# Patient Record
Sex: FEMALE | HISPANIC OR LATINO
[De-identification: names, ages, dates, MRNs, and addresses within clinical notes are randomized per-mention and may not be internally consistent; named-entity substitution may affect disease eponyms.]

---

## 2017-01-09 ENCOUNTER — CHARTING TRANS (OUTPATIENT)
Dept: OTHER | Age: 42
End: 2017-01-09

## 2017-01-09 ENCOUNTER — LAB SERVICES (OUTPATIENT)
Dept: OTHER | Age: 42
End: 2017-01-09

## 2017-01-10 ENCOUNTER — CHARTING TRANS (OUTPATIENT)
Dept: OTHER | Age: 42
End: 2017-01-10

## 2017-01-10 LAB
ANION GAP SERPL CALC-SCNC: 13 MMOL/L (ref 10–20)
APTT PPP: 28 SEC (ref 22–30)
BUN SERPL-MCNC: 10 MG/DL (ref 10–20)
BUN/CREAT SERPL: 18 (ref 7–25)
CALCIUM SERPL-MCNC: 8.7 MG/DL (ref 8.4–10.2)
CHLORIDE SERPL-SCNC: 103 MMOL/L (ref 98–107)
CO2 SERPL-SCNC: 25 MMOL/L (ref 21–32)
CREAT SERPL-MCNC: 0.56 MG/DL (ref 0.51–0.95)
ERYTHROCYTE [DISTWIDTH] IN BLOOD: 12.9 % (ref 11–15)
GLUCOSE SERPL-MCNC: 78 MG/DL (ref 65–99)
HEMATOCRIT: 38.6 % (ref 36–46.5)
HEMOGLOBIN: 12.8 G/DL (ref 12–15.5)
INR PPP: 1
LENGTH OF FAST TIME PATIENT: NORMAL HRS
MEAN CORPUSCULAR HEMOGLOBIN: 30.2 PG (ref 26–34)
MEAN CORPUSCULAR HGB CONC: 33.2 G/DL (ref 32–36.5)
MEAN CORPUSCULAR VOLUME: 91 FL (ref 78–100)
PLATELET COUNT: 265 K/MCL (ref 140–450)
POTASSIUM SERPL-SCNC: 4.1 MMOL/L (ref 3.4–5.1)
PROTHROMBIN TIME: 11.2 SEC (ref 9.7–11.8)
RED CELL COUNT: 4.24 MIL/MCL (ref 4–5.2)
SODIUM SERPL-SCNC: 137 MMOL/L (ref 135–145)
WHITE BLOOD COUNT: 5.3 K/MCL (ref 4.2–11)

## 2017-02-01 ENCOUNTER — HOSPITAL (OUTPATIENT)
Dept: OTHER | Age: 42
End: 2017-02-01
Attending: OBSTETRICS & GYNECOLOGY

## 2017-02-02 LAB
ANALYZER ANC (IANC): ABNORMAL
BASOPHILS # BLD: 0 THOUSAND/MCL (ref 0–0.3)
BASOPHILS NFR BLD: 0 %
DIFFERENTIAL METHOD BLD: ABNORMAL
EOSINOPHIL # BLD: 0 THOUSAND/MCL (ref 0.1–0.5)
EOSINOPHIL NFR BLD: 0 %
ERYTHROCYTE [DISTWIDTH] IN BLOOD: 12.6 % (ref 11–15)
HEMATOCRIT: 37.4 % (ref 36–46.5)
HGB BLD-MCNC: 12.8 GM/DL (ref 12–15.5)
LYMPHOCYTES # BLD: 1.2 THOUSAND/MCL (ref 1–4.8)
LYMPHOCYTES NFR BLD: 10 %
MCH RBC QN AUTO: 30 PG (ref 26–34)
MCHC RBC AUTO-ENTMCNC: 34.2 GM/DL (ref 32–36.5)
MCV RBC AUTO: 87.8 FL (ref 78–100)
MONOCYTES # BLD: 0.7 THOUSAND/MCL (ref 0.3–0.9)
MONOCYTES NFR BLD: 6 %
NEUTROPHILS # BLD: 10 THOUSAND/MCL (ref 1.8–7.7)
NEUTROPHILS NFR BLD: 84 %
NEUTS SEG NFR BLD: ABNORMAL %
PERCENT NRBC: ABNORMAL
PLATELET # BLD: 227 THOUSAND/MCL (ref 140–450)
RBC # BLD: 4.26 MILLION/MCL (ref 4–5.2)
WBC # BLD: 11.9 THOUSAND/MCL (ref 4.2–11)

## 2017-02-07 ENCOUNTER — CHARTING TRANS (OUTPATIENT)
Dept: OTHER | Age: 42
End: 2017-02-07

## 2017-02-10 ENCOUNTER — LAB SERVICES (OUTPATIENT)
Dept: OTHER | Age: 42
End: 2017-02-10

## 2017-02-10 ENCOUNTER — CHARTING TRANS (OUTPATIENT)
Dept: OTHER | Age: 42
End: 2017-02-10

## 2017-02-10 ASSESSMENT — PAIN SCALES - GENERAL: PAINLEVEL_OUTOF10: 7

## 2017-02-11 LAB
APPEARANCE: NORMAL
BILIRUBIN: NORMAL
COLOR: YELLOW
GLUCOSE U: NORMAL
KETONES: NORMAL
LEUKOCYTE ESTERASE: NORMAL
NITRITE: NORMAL
OCCULT BLOOD: NORMAL
PH: 6.5
PROTEIN: NORMAL
URINE SPEC GRAVITY: 1.02
UROBILINOGEN: 0.2

## 2017-02-15 ENCOUNTER — LAB SERVICES (OUTPATIENT)
Dept: OTHER | Age: 42
End: 2017-02-15

## 2017-02-15 ENCOUNTER — CHARTING TRANS (OUTPATIENT)
Dept: OTHER | Age: 42
End: 2017-02-15

## 2017-02-15 ASSESSMENT — PAIN SCALES - GENERAL: PAINLEVEL_OUTOF10: 4

## 2017-02-16 LAB
APPEARANCE: NORMAL
BILIRUBIN: NORMAL
COLOR: YELLOW
GLUCOSE U: NORMAL
KETONES: NORMAL
LEUKOCYTE ESTERASE: NORMAL
NITRITE: NORMAL
OCCULT BLOOD: NORMAL
PH: 7
PROTEIN: NORMAL
URINE SPEC GRAVITY: 1.02
UROBILINOGEN: 0.2

## 2017-02-24 ENCOUNTER — CHARTING TRANS (OUTPATIENT)
Dept: OTHER | Age: 42
End: 2017-02-24

## 2017-03-22 ENCOUNTER — CHARTING TRANS (OUTPATIENT)
Dept: OTHER | Age: 42
End: 2017-03-22

## 2017-03-22 ENCOUNTER — MYAURORA ACCOUNT LINK (OUTPATIENT)
Dept: OTHER | Age: 42
End: 2017-03-22

## 2017-03-22 ENCOUNTER — IMAGING SERVICES (OUTPATIENT)
Dept: OTHER | Age: 42
End: 2017-03-22

## 2017-03-23 ENCOUNTER — CHARTING TRANS (OUTPATIENT)
Dept: OTHER | Age: 42
End: 2017-03-23

## 2017-03-29 ENCOUNTER — LAB SERVICES (OUTPATIENT)
Dept: OTHER | Age: 42
End: 2017-03-29

## 2017-03-29 ENCOUNTER — CHARTING TRANS (OUTPATIENT)
Dept: OTHER | Age: 42
End: 2017-03-29

## 2017-03-29 ENCOUNTER — MYAURORA ACCOUNT LINK (OUTPATIENT)
Dept: OTHER | Age: 42
End: 2017-03-29

## 2017-03-29 LAB
APPEARANCE: CLEAR
BILIRUBIN: NORMAL
COLOR: YELLOW
GLUCOSE U: NORMAL
KETONES: NORMAL
LEUKOCYTE ESTERASE: NORMAL
NITRITE: NORMAL
OCCULT BLOOD: NORMAL
PH: 7
PROTEIN: NORMAL
URINE SPEC GRAVITY: 1.02
UROBILINOGEN: 0.2

## 2017-03-29 ASSESSMENT — PAIN SCALES - GENERAL: PAINLEVEL_OUTOF10: 0

## 2017-04-11 ENCOUNTER — CHARTING TRANS (OUTPATIENT)
Dept: OTHER | Age: 42
End: 2017-04-11

## 2017-09-21 ENCOUNTER — CHARTING TRANS (OUTPATIENT)
Dept: OTHER | Age: 42
End: 2017-09-21

## 2018-03-09 ENCOUNTER — APPOINTMENT (OUTPATIENT)
Dept: CT IMAGING | Facility: HOSPITAL | Age: 43
End: 2018-03-09
Attending: EMERGENCY MEDICINE
Payer: COMMERCIAL

## 2018-03-09 ENCOUNTER — HOSPITAL ENCOUNTER (EMERGENCY)
Facility: HOSPITAL | Age: 43
Discharge: HOME | End: 2018-03-09
Payer: COMMERCIAL

## 2018-03-09 ENCOUNTER — HOSPITAL ENCOUNTER (OUTPATIENT)
Age: 43
Discharge: ANOTHER HOSPITAL | End: 2018-03-09
Attending: EMERGENCY MEDICINE
Payer: COMMERCIAL

## 2018-03-09 VITALS
DIASTOLIC BLOOD PRESSURE: 94 MMHG | OXYGEN SATURATION: 100 % | RESPIRATION RATE: 16 BRPM | HEART RATE: 87 BPM | SYSTOLIC BLOOD PRESSURE: 139 MMHG | TEMPERATURE: 98 F

## 2018-03-09 VITALS
HEIGHT: 63 IN | SYSTOLIC BLOOD PRESSURE: 130 MMHG | DIASTOLIC BLOOD PRESSURE: 91 MMHG | OXYGEN SATURATION: 99 % | HEART RATE: 85 BPM | RESPIRATION RATE: 18 BRPM | TEMPERATURE: 99 F | BODY MASS INDEX: 31.01 KG/M2 | WEIGHT: 175 LBS

## 2018-03-09 DIAGNOSIS — R07.89 CHEST PAIN, ATYPICAL: ICD-10-CM

## 2018-03-09 DIAGNOSIS — J02.0 STREP PHARYNGITIS: Primary | ICD-10-CM

## 2018-03-09 DIAGNOSIS — G44.52 NEW DAILY PERSISTENT HEADACHE: Primary | ICD-10-CM

## 2018-03-09 DIAGNOSIS — Z84.0 FAMILY HISTORY OF LUPUS ERYTHEMATOSUS: ICD-10-CM

## 2018-03-09 LAB
ANION GAP SERPL CALC-SCNC: 8 MMOL/L (ref 0–18)
BACTERIA UR QL AUTO: NEGATIVE /HPF
BASOPHILS # BLD: 0 K/UL (ref 0–0.2)
BASOPHILS NFR BLD: 0 %
BILIRUB UR QL: NEGATIVE
BUN SERPL-MCNC: 9 MG/DL (ref 8–20)
BUN/CREAT SERPL: 14.5 (ref 10–20)
CALCIUM SERPL-MCNC: 8.7 MG/DL (ref 8.5–10.5)
CHLORIDE SERPL-SCNC: 103 MMOL/L (ref 95–110)
CLARITY UR: CLEAR
CO2 SERPL-SCNC: 27 MMOL/L (ref 22–32)
COLOR UR: YELLOW
CREAT SERPL-MCNC: 0.62 MG/DL (ref 0.5–1.5)
CRP SERPL-MCNC: 0.5 MG/DL (ref 0–0.9)
EOSINOPHIL # BLD: 0.2 K/UL (ref 0–0.7)
EOSINOPHIL NFR BLD: 3 %
ERYTHROCYTE [DISTWIDTH] IN BLOOD BY AUTOMATED COUNT: 12.3 % (ref 11–15)
ERYTHROCYTE [SEDIMENTATION RATE] IN BLOOD: 9 MM/HR (ref 0–20)
GLUCOSE BLDC GLUCOMTR-MCNC: 76 MG/DL (ref 70–99)
GLUCOSE SERPL-MCNC: 83 MG/DL (ref 70–99)
GLUCOSE UR-MCNC: NEGATIVE MG/DL
HCT VFR BLD AUTO: 38.1 % (ref 35–48)
HGB BLD-MCNC: 13 G/DL (ref 12–16)
HGB UR QL STRIP.AUTO: NEGATIVE
KETONES UR-MCNC: NEGATIVE MG/DL
LEUKOCYTE ESTERASE UR QL STRIP.AUTO: NEGATIVE
LYMPHOCYTES # BLD: 2.1 K/UL (ref 1–4)
LYMPHOCYTES NFR BLD: 34 %
MCH RBC QN AUTO: 30.3 PG (ref 27–32)
MCHC RBC AUTO-ENTMCNC: 34 G/DL (ref 32–37)
MCV RBC AUTO: 89.2 FL (ref 80–100)
MONOCYTES # BLD: 0.4 K/UL (ref 0–1)
MONOCYTES NFR BLD: 7 %
NEUTROPHILS # BLD AUTO: 3.4 K/UL (ref 1.8–7.7)
NEUTROPHILS NFR BLD: 55 %
NITRITE UR QL STRIP.AUTO: NEGATIVE
OSMOLALITY UR CALC.SUM OF ELEC: 284 MOSM/KG (ref 275–295)
PH UR: 5 [PH] (ref 5–8)
PLATELET # BLD AUTO: 238 K/UL (ref 140–400)
PMV BLD AUTO: 8.7 FL (ref 7.4–10.3)
POTASSIUM SERPL-SCNC: 3.7 MMOL/L (ref 3.3–5.1)
PROT UR-MCNC: NEGATIVE MG/DL
RBC # BLD AUTO: 4.27 M/UL (ref 3.7–5.4)
RBC #/AREA URNS AUTO: 2 /HPF
S PYO AG THROAT QL: POSITIVE
SODIUM SERPL-SCNC: 138 MMOL/L (ref 136–144)
SP GR UR STRIP: 1.02 (ref 1–1.03)
TROPONIN I SERPL-MCNC: 0 NG/ML (ref ?–0.03)
UROBILINOGEN UR STRIP-ACNC: <2
VIT C UR-MCNC: 40 MG/DL
WBC # BLD AUTO: 6.2 K/UL (ref 4–11)
WBC #/AREA URNS AUTO: <1 /HPF

## 2018-03-09 PROCEDURE — 82962 GLUCOSE BLOOD TEST: CPT

## 2018-03-09 PROCEDURE — 80048 BASIC METABOLIC PNL TOTAL CA: CPT | Performed by: NURSE PRACTITIONER

## 2018-03-09 PROCEDURE — 84484 ASSAY OF TROPONIN QUANT: CPT | Performed by: NURSE PRACTITIONER

## 2018-03-09 PROCEDURE — 99284 EMERGENCY DEPT VISIT MOD MDM: CPT

## 2018-03-09 PROCEDURE — 87430 STREP A AG IA: CPT

## 2018-03-09 PROCEDURE — 70450 CT HEAD/BRAIN W/O DYE: CPT | Performed by: EMERGENCY MEDICINE

## 2018-03-09 PROCEDURE — 96374 THER/PROPH/DIAG INJ IV PUSH: CPT

## 2018-03-09 PROCEDURE — 81003 URINALYSIS AUTO W/O SCOPE: CPT | Performed by: NURSE PRACTITIONER

## 2018-03-09 PROCEDURE — 86140 C-REACTIVE PROTEIN: CPT | Performed by: NURSE PRACTITIONER

## 2018-03-09 PROCEDURE — 85025 COMPLETE CBC W/AUTO DIFF WBC: CPT | Performed by: NURSE PRACTITIONER

## 2018-03-09 PROCEDURE — 93005 ELECTROCARDIOGRAM TRACING: CPT

## 2018-03-09 PROCEDURE — 99204 OFFICE O/P NEW MOD 45 MIN: CPT

## 2018-03-09 PROCEDURE — 93010 ELECTROCARDIOGRAM REPORT: CPT

## 2018-03-09 PROCEDURE — 96361 HYDRATE IV INFUSION ADD-ON: CPT

## 2018-03-09 PROCEDURE — 93010 ELECTROCARDIOGRAM REPORT: CPT | Performed by: EMERGENCY MEDICINE

## 2018-03-09 PROCEDURE — 85652 RBC SED RATE AUTOMATED: CPT | Performed by: NURSE PRACTITIONER

## 2018-03-09 RX ORDER — AMOXICILLIN 875 MG/1
875 TABLET, COATED ORAL 2 TIMES DAILY
Qty: 20 TABLET | Refills: 0 | Status: SHIPPED | OUTPATIENT
Start: 2018-03-09 | End: 2018-03-19

## 2018-03-09 RX ORDER — ONDANSETRON 2 MG/ML
INJECTION INTRAMUSCULAR; INTRAVENOUS
Status: COMPLETED
Start: 2018-03-09 | End: 2018-03-09

## 2018-03-09 RX ORDER — ONDANSETRON 2 MG/ML
4 INJECTION INTRAMUSCULAR; INTRAVENOUS ONCE
Status: COMPLETED | OUTPATIENT
Start: 2018-03-09 | End: 2018-03-09

## 2018-03-09 RX ORDER — METFORMIN HYDROCHLORIDE 750 MG/1
750 TABLET, EXTENDED RELEASE ORAL 2 TIMES DAILY WITH MEALS
COMMUNITY

## 2018-03-09 NOTE — ED INITIAL ASSESSMENT (HPI)
Sent from [facility] for Sore throat, right ear pain, HA, joint pain, and fatigue x3 weeks. +strep at [facility]. Pt has tried natural remedies with no relief. Denies having any fevers, but reports night sweats.

## 2018-03-09 NOTE — ED NOTES
Patient referred to ER for further evaluation. She will present to [address] [place] [address].  Report phoned by MD.

## 2018-03-09 NOTE — ED NOTES
Patient states she has had joint pain, neck pain, fatigue, left arm tingling and a sore throat for the last three weeks. States she gets diaphoretic at times, but for no reason.  Also states she has a weird \"heavy\" feeling in her chest. Was at [address] [place] [address] today, h

## 2018-03-09 NOTE — ED PROVIDER NOTES
Patient Seen in: [facility] AND [facility] Emergency Department    History   CC: sore throat  HPI: Jevonklaudia Ludin 36year old female w/ hx PCOS who presents to the ER c/o sore throat, headache, fatigue, generalized myalgias that have been intermittently present fo 750 mg by mouth 2 (two) times daily with meals. Constitutional and vital signs reviewed.         Physical Exam   ED Triage Vitals [03/09/18 1218]  BP: (!) 163/76  Pulse: 91  Resp: 18  Temp: 98.5 °F (36.9 °C)  Temp src: Oral  SpO2: 98 %  O2 Device: appropriate      ED Course     Labs Reviewed   URINALYSIS WITH CULTURE REFLEX - Abnormal; Notable for the following:        Result Value    Ascorbic Acid Urine 40  (*)     All other components within normal limits   BASIC METABOLIC PANEL (8) - Normal   TRO medications    amoxicillin 875 MG Oral Tab  Take 1 tablet (875 mg total) by mouth 2 (two) times daily.   Qty: 20 tablet Refills: 0

## 2018-03-09 NOTE — ED INITIAL ASSESSMENT (HPI)
Patient comes in with sore throat, headache, tiredness on and off for three weeks, but the last week it has gotten progressively worse.

## 2018-03-09 NOTE — ED PROVIDER NOTES
Patient Seen in: [address]    History   Patient presents with:  Sore Throat    Stated Complaint: SORE THROAT HEADACHE     HPI    The patient is a 40-year-old female with a history of polycystic ovaries who presents with Triage Vitals [03/09/18 1108]  BP: 139/94  Pulse: 87  Resp: 16  Temp: 98.1 °F (36.7 °C)  Temp src: Oral  SpO2: 100 %  O2 Device: None (Room air)    Current:/94   Pulse 87   Temp 98.1 °F (36.7 °C) (Oral)   Resp 16   LMP  (LMP Unknown)   SpO2 100% evaluation. Disposition and Plan     Clinical Impression:  New daily persistent headache  (primary encounter diagnosis)  Family history of lupus erythematosus  Chest pain, atypical    Disposition:   Ic to ed  3/9/2018 11:53 am    Follow-up:  Mora douglass

## 2018-08-27 ENCOUNTER — CHARTING TRANS (OUTPATIENT)
Dept: OTHER | Age: 43
End: 2018-08-27

## 2018-08-27 ENCOUNTER — MYAURORA ACCOUNT LINK (OUTPATIENT)
Dept: OTHER | Age: 43
End: 2018-08-27

## 2018-08-27 ENCOUNTER — LAB SERVICES (OUTPATIENT)
Dept: OTHER | Age: 43
End: 2018-08-27

## 2018-08-27 ASSESSMENT — PAIN SCALES - GENERAL: PAINLEVEL_OUTOF10: 0

## 2018-09-28 ENCOUNTER — MYAURORA ACCOUNT LINK (OUTPATIENT)
Dept: OTHER | Age: 43
End: 2018-09-28

## 2018-09-28 ENCOUNTER — LAB SERVICES (OUTPATIENT)
Dept: OTHER | Age: 43
End: 2018-09-28

## 2018-09-28 ENCOUNTER — CHARTING TRANS (OUTPATIENT)
Dept: OTHER | Age: 43
End: 2018-09-28

## 2018-09-29 ENCOUNTER — CHARTING TRANS (OUTPATIENT)
Dept: OTHER | Age: 43
End: 2018-09-29

## 2018-09-29 LAB
ALBUMIN SERPL-MCNC: 4 G/DL (ref 3.6–5.1)
ALBUMIN/GLOB SERPL: 1.1 (ref 1–2.4)
ALP SERPL-CCNC: 53 UNITS/L (ref 45–117)
ALT SERPL-CCNC: 34 UNITS/L
ANION GAP SERPL CALC-SCNC: 10 MMOL/L (ref 10–20)
AST SERPL-CCNC: 20 UNITS/L
BASOPHILS # BLD: 0 K/MCL (ref 0–0.3)
BASOPHILS NFR BLD: 1 %
BILIRUB SERPL-MCNC: 0.4 MG/DL (ref 0.2–1)
BUN SERPL-MCNC: 12 MG/DL (ref 6–20)
BUN/CREAT SERPL: 21 (ref 7–25)
CALCIUM SERPL-MCNC: 8.7 MG/DL (ref 8.4–10.2)
CHLORIDE SERPL-SCNC: 104 MMOL/L (ref 98–107)
CHOLEST SERPL-MCNC: 90 MG/DL
CHOLEST/HDLC SERPL: 1.9
CO2 SERPL-SCNC: 29 MMOL/L (ref 21–32)
CREAT SERPL-MCNC: 0.56 MG/DL (ref 0.51–0.95)
DIFFERENTIAL METHOD BLD: NORMAL
EOSINOPHIL # BLD: 0.1 K/MCL (ref 0.1–0.5)
EOSINOPHIL NFR BLD: 2 %
ERYTHROCYTE [DISTWIDTH] IN BLOOD: 12.2 % (ref 11–15)
GLOBULIN SER-MCNC: 3.7 G/DL (ref 2–4)
GLUCOSE SERPL-MCNC: 89 MG/DL (ref 65–99)
HBA1C MFR BLD: 5.3 % (ref 4.5–5.6)
HDLC SERPL-MCNC: 47 MG/DL
HEMATOCRIT: 40.3 % (ref 36–46.5)
HEMOGLOBIN: 13.5 G/DL (ref 12–15.5)
IMM GRANULOCYTES # BLD AUTO: 0 K/MCL (ref 0–0.2)
IMM GRANULOCYTES NFR BLD: 0 %
LDLC SERPL CALC-MCNC: 32 MG/DL
LENGTH OF FAST TIME PATIENT: 0 HRS
LENGTH OF FAST TIME PATIENT: 0 HRS
LYMPHOCYTES # BLD: 1.7 K/MCL (ref 1–4.8)
LYMPHOCYTES NFR BLD: 37 %
MEAN CORPUSCULAR HEMOGLOBIN: 29.9 PG (ref 26–34)
MEAN CORPUSCULAR HGB CONC: 33.5 G/DL (ref 32–36.5)
MEAN CORPUSCULAR VOLUME: 89.2 FL (ref 78–100)
MONOCYTES # BLD: 0.4 K/MCL (ref 0.3–0.9)
MONOCYTES NFR BLD: 8 %
NEUTROPHILS # BLD: 2.4 K/MCL (ref 1.8–7.7)
NEUTROPHILS NFR BLD: 52 %
NONHDLC SERPL-MCNC: 43 MG/DL
NRBC (NRBCRE): 0 /100 WBC
PLATELET COUNT: 234 K/MCL (ref 140–450)
POTASSIUM SERPL-SCNC: 4.4 MMOL/L (ref 3.4–5.1)
RED CELL COUNT: 4.52 MIL/MCL (ref 4–5.2)
SODIUM SERPL-SCNC: 139 MMOL/L (ref 135–145)
TOTAL PROTEIN: 7.7 G/DL (ref 6.4–8.2)
TRIGL SERPL-MCNC: 54 MG/DL
TSH SERPL-ACNC: 1.04 MCUNITS/ML (ref 0.35–5)
WHITE BLOOD COUNT: 4.6 K/MCL (ref 4.2–11)

## 2018-10-26 ENCOUNTER — CHARTING TRANS (OUTPATIENT)
Dept: OTHER | Age: 43
End: 2018-10-26

## 2018-11-04 VITALS
WEIGHT: 188 LBS | TEMPERATURE: 98.1 F | SYSTOLIC BLOOD PRESSURE: 140 MMHG | HEIGHT: 63 IN | HEART RATE: 94 BPM | BODY MASS INDEX: 33.31 KG/M2 | DIASTOLIC BLOOD PRESSURE: 80 MMHG

## 2018-11-05 VITALS
TEMPERATURE: 98.1 F | HEART RATE: 98 BPM | RESPIRATION RATE: 20 BRPM | HEIGHT: 63 IN | WEIGHT: 185.13 LBS | BODY MASS INDEX: 32.8 KG/M2 | SYSTOLIC BLOOD PRESSURE: 140 MMHG | DIASTOLIC BLOOD PRESSURE: 90 MMHG

## 2018-11-05 VITALS
WEIGHT: 187 LBS | BODY MASS INDEX: 31.92 KG/M2 | HEART RATE: 96 BPM | HEIGHT: 64 IN | SYSTOLIC BLOOD PRESSURE: 146 MMHG | DIASTOLIC BLOOD PRESSURE: 84 MMHG

## 2018-11-05 VITALS
RESPIRATION RATE: 20 BRPM | WEIGHT: 185 LBS | HEIGHT: 63 IN | OXYGEN SATURATION: 99 % | SYSTOLIC BLOOD PRESSURE: 153 MMHG | TEMPERATURE: 98.2 F | HEART RATE: 91 BPM | BODY MASS INDEX: 32.78 KG/M2 | DIASTOLIC BLOOD PRESSURE: 78 MMHG

## 2018-11-05 VITALS
WEIGHT: 182.98 LBS | HEART RATE: 94 BPM | HEIGHT: 64 IN | BODY MASS INDEX: 31.24 KG/M2 | RESPIRATION RATE: 15 BRPM | TEMPERATURE: 99.3 F

## 2018-11-05 VITALS
TEMPERATURE: 98.3 F | RESPIRATION RATE: 16 BRPM | HEART RATE: 89 BPM | BODY MASS INDEX: 32.27 KG/M2 | WEIGHT: 189 LBS | HEIGHT: 64 IN

## 2018-11-06 VITALS
HEIGHT: 64 IN | WEIGHT: 189 LBS | TEMPERATURE: 98.1 F | SYSTOLIC BLOOD PRESSURE: 130 MMHG | HEART RATE: 76 BPM | BODY MASS INDEX: 32.27 KG/M2 | DIASTOLIC BLOOD PRESSURE: 71 MMHG

## 2018-11-27 VITALS
SYSTOLIC BLOOD PRESSURE: 144 MMHG | OXYGEN SATURATION: 99 % | RESPIRATION RATE: 18 BRPM | HEIGHT: 63 IN | WEIGHT: 186 LBS | HEART RATE: 84 BPM | BODY MASS INDEX: 32.96 KG/M2 | DIASTOLIC BLOOD PRESSURE: 88 MMHG | TEMPERATURE: 97.6 F

## 2018-11-27 VITALS
DIASTOLIC BLOOD PRESSURE: 90 MMHG | SYSTOLIC BLOOD PRESSURE: 150 MMHG | TEMPERATURE: 98.3 F | HEIGHT: 63 IN | BODY MASS INDEX: 32.6 KG/M2 | HEART RATE: 82 BPM | WEIGHT: 184 LBS

## 2018-12-13 ENCOUNTER — TELEPHONE (OUTPATIENT)
Dept: SCHEDULING | Age: 43
End: 2018-12-13

## 2019-05-16 ENCOUNTER — TELEPHONE (OUTPATIENT)
Dept: SCHEDULING | Age: 44
End: 2019-05-16

## 2020-08-16 ENCOUNTER — APPOINTMENT (OUTPATIENT)
Dept: GENERAL RADIOLOGY | Age: 45
End: 2020-08-16
Attending: NURSE PRACTITIONER
Payer: COMMERCIAL

## 2020-08-16 ENCOUNTER — HOSPITAL ENCOUNTER (OUTPATIENT)
Age: 45
Discharge: HOME | End: 2020-08-16
Payer: COMMERCIAL

## 2020-08-16 VITALS
BODY MASS INDEX: 31.89 KG/M2 | SYSTOLIC BLOOD PRESSURE: 135 MMHG | HEIGHT: 63 IN | RESPIRATION RATE: 18 BRPM | WEIGHT: 180 LBS | HEART RATE: 97 BPM | OXYGEN SATURATION: 98 % | TEMPERATURE: 98 F | DIASTOLIC BLOOD PRESSURE: 77 MMHG
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
## 2020-08-16 DIAGNOSIS — S61.259A DOG BITE OF FINGER, INITIAL ENCOUNTER: Primary | ICD-10-CM

## 2020-08-16 PROCEDURE — 73140 X-RAY EXAM OF FINGER(S): CPT | Performed by: NURSE PRACTITIONER

## 2020-08-16 PROCEDURE — 90471 IMMUNIZATION ADMIN: CPT | Performed by: NURSE PRACTITIONER

## 2020-08-16 PROCEDURE — 90715 TDAP VACCINE 7 YRS/> IM: CPT | Performed by: NURSE PRACTITIONER

## 2020-08-16 PROCEDURE — 99214 OFFICE O/P EST MOD 30 MIN: CPT | Performed by: NURSE PRACTITIONER

## 2020-08-16 RX ORDER — AMOXICILLIN AND CLAVULANATE POTASSIUM 875; 125 MG/1; MG/1
1 TABLET, FILM COATED ORAL 2 TIMES DAILY
Qty: 20 TABLET | Refills: 0 | Status: SHIPPED | OUTPATIENT
Start: 2020-08-16 | End: 2020-08-26

## 2020-08-16 NOTE — ED PROVIDER NOTES
Patient presents with:  Bite      HPI:     Marely Welch is a 38year old female presents for a chief complaint of a dog bite over the PIP joint of the left middle digit. The patient was bit by a family member's dog yesterday. It was a large dog.   The dog Social connections:        Talks on phone: Not on file        Gets together: Not on file        Attends Taoist service: Not on file        Active member of club or organization: Not on file        Attends meetings of clubs or organizations: Not on patrica RRW  CARDIO: RRR without murmur  EXTREMITIES: no cyanosis or edema. HARKINS without difficulty. Dog bite/digit description above. NEURO: CMS intact left middle digit. Brisk cap refill.        MDM/Assessment/Plan:   Orders for this encounter:    Orders Placed discharge. Diagnosis:    ICD-10-CM    1. Dog bite of finger, initial encounter S61.259A XR FINGER(S) (MIN 2 VIEWS), LEFT 3RD (CPT=73140)    W54. 0XXA XR FINGER(S) (MIN 2 VIEWS), LEFT 3RD (CPT=73140)       All results reviewed and discussed with patient.

## 2021-08-17 ENCOUNTER — APPOINTMENT (OUTPATIENT)
Dept: GENERAL RADIOLOGY | Age: 46
End: 2021-08-17
Attending: NURSE PRACTITIONER
Payer: COMMERCIAL

## 2021-08-17 ENCOUNTER — HOSPITAL ENCOUNTER (OUTPATIENT)
Age: 46
Discharge: HOME | End: 2021-08-17
Payer: COMMERCIAL

## 2021-08-17 VITALS
DIASTOLIC BLOOD PRESSURE: 75 MMHG | SYSTOLIC BLOOD PRESSURE: 139 MMHG | HEART RATE: 94 BPM | OXYGEN SATURATION: 98 % | WEIGHT: 195 LBS | HEIGHT: 63 IN | TEMPERATURE: 100 F | RESPIRATION RATE: 18 BRPM | BODY MASS INDEX: 34.55 KG/M2

## 2021-08-17 DIAGNOSIS — R05.8 DRY COUGH: Primary | ICD-10-CM

## 2021-08-17 LAB — S PYO AG THROAT QL: NEGATIVE

## 2021-08-17 PROCEDURE — 71046 X-RAY EXAM CHEST 2 VIEWS: CPT | Performed by: NURSE PRACTITIONER

## 2021-08-17 PROCEDURE — 87880 STREP A ASSAY W/OPTIC: CPT | Performed by: NURSE PRACTITIONER

## 2021-08-17 PROCEDURE — 99213 OFFICE O/P EST LOW 20 MIN: CPT | Performed by: NURSE PRACTITIONER

## 2021-08-17 RX ORDER — PREDNISONE 20 MG/1
40 TABLET ORAL DAILY
Qty: 10 TABLET | Refills: 0 | Status: SHIPPED | OUTPATIENT
Start: 2021-08-17 | End: 2021-08-22

## 2021-08-17 NOTE — ED PROVIDER NOTES
Patient presents with:  Cough/URI      HPI:     Carlos Evangelista is a 54year old female who presents for a chest x-ray. The patient states she has had a 4-month history of coughing spasms. She states they occur about 3 times a day.   She states she gets a dr per Week:       Minutes of Exercise per Session:   Stress:       Feeling of Stress :   Social Connections:       Frequency of Communication with Friends and Family:       Frequency of Social Gatherings with Friends and Family:       Attends [employer] (from the past 10 hour(s))   POCT Rapid Strep    Collection Time: 08/17/21  4:19 PM   Result Value Ref Range    POCT Rapid Strep Negative Negative       MDM:   XR CHEST PA + LAT CHEST (CPT=71046)    Result Date: 8/17/2021  CONCLUSION:  1.  No acute cardiopu

## 2022-03-17 ENCOUNTER — APPOINTMENT (OUTPATIENT)
Dept: INTERNAL MEDICINE | Age: 47
End: 2022-03-17

## 2024-02-20 ENCOUNTER — HOSPITAL ENCOUNTER (EMERGENCY)
Facility: HOSPITAL | Age: 49
Discharge: HOME | End: 2024-02-20
Attending: STUDENT IN AN ORGANIZED HEALTH CARE EDUCATION/TRAINING PROGRAM
Payer: COMMERCIAL

## 2024-02-20 ENCOUNTER — APPOINTMENT (OUTPATIENT)
Dept: GENERAL RADIOLOGY | Facility: HOSPITAL | Age: 49
End: 2024-02-20
Attending: STUDENT IN AN ORGANIZED HEALTH CARE EDUCATION/TRAINING PROGRAM
Payer: COMMERCIAL

## 2024-02-20 VITALS
TEMPERATURE: 98 F | OXYGEN SATURATION: 98 % | SYSTOLIC BLOOD PRESSURE: 141 MMHG | DIASTOLIC BLOOD PRESSURE: 79 MMHG | HEART RATE: 98 BPM | RESPIRATION RATE: 18 BRPM

## 2024-02-20 DIAGNOSIS — M54.50 ACUTE RIGHT-SIDED LOW BACK PAIN WITHOUT SCIATICA: Primary | ICD-10-CM

## 2024-02-20 DIAGNOSIS — R55 SYNCOPE AND COLLAPSE: ICD-10-CM

## 2024-02-20 LAB
ANION GAP SERPL CALC-SCNC: 6 MMOL/L (ref 0–18)
BASOPHILS # BLD AUTO: 0.03 X10(3) UL (ref 0–0.2)
BASOPHILS NFR BLD AUTO: 0.4 %
BILIRUB UR QL: NEGATIVE
BUN BLD-MCNC: 14 MG/DL (ref 9–23)
BUN/CREAT SERPL: 22.6 (ref 10–20)
CALCIUM BLD-MCNC: 9.4 MG/DL (ref 8.7–10.4)
CHLORIDE SERPL-SCNC: 106 MMOL/L (ref 98–112)
CLARITY UR: CLEAR
CO2 SERPL-SCNC: 29 MMOL/L (ref 21–32)
COLOR UR: YELLOW
CREAT BLD-MCNC: 0.62 MG/DL
DEPRECATED RDW RBC AUTO: 37.6 FL (ref 35.1–46.3)
EGFRCR SERPLBLD CKD-EPI 2021: 109 ML/MIN/1.73M2 (ref 60–?)
EOSINOPHIL # BLD AUTO: 0.07 X10(3) UL (ref 0–0.7)
EOSINOPHIL NFR BLD AUTO: 0.8 %
ERYTHROCYTE [DISTWIDTH] IN BLOOD BY AUTOMATED COUNT: 11.9 % (ref 11–15)
GLUCOSE BLD-MCNC: 87 MG/DL (ref 70–99)
GLUCOSE UR-MCNC: NORMAL MG/DL
HCT VFR BLD AUTO: 42.1 %
HGB BLD-MCNC: 15.2 G/DL
HGB UR QL STRIP.AUTO: NEGATIVE
IMM GRANULOCYTES # BLD AUTO: 0.02 X10(3) UL (ref 0–1)
IMM GRANULOCYTES NFR BLD: 0.2 %
KETONES UR-MCNC: NEGATIVE MG/DL
LEUKOCYTE ESTERASE UR QL STRIP.AUTO: NEGATIVE
LYMPHOCYTES # BLD AUTO: 2 X10(3) UL (ref 1–4)
LYMPHOCYTES NFR BLD AUTO: 24 %
MCH RBC QN AUTO: 31.3 PG (ref 26–34)
MCHC RBC AUTO-ENTMCNC: 36.1 G/DL (ref 31–37)
MCV RBC AUTO: 86.6 FL
MONOCYTES # BLD AUTO: 0.38 X10(3) UL (ref 0.1–1)
MONOCYTES NFR BLD AUTO: 4.6 %
NEUTROPHILS # BLD AUTO: 5.84 X10 (3) UL (ref 1.5–7.7)
NEUTROPHILS # BLD AUTO: 5.84 X10(3) UL (ref 1.5–7.7)
NEUTROPHILS NFR BLD AUTO: 70 %
NITRITE UR QL STRIP.AUTO: NEGATIVE
OSMOLALITY SERPL CALC.SUM OF ELEC: 292 MOSM/KG (ref 275–295)
PH UR: 6 [PH] (ref 5–8)
PLATELET # BLD AUTO: 246 10(3)UL (ref 150–450)
POTASSIUM SERPL-SCNC: 3.6 MMOL/L (ref 3.5–5.1)
PROT UR-MCNC: NEGATIVE MG/DL
RBC # BLD AUTO: 4.86 X10(6)UL
SODIUM SERPL-SCNC: 141 MMOL/L (ref 136–145)
SP GR UR STRIP: 1.02 (ref 1–1.03)
UROBILINOGEN UR STRIP-ACNC: NORMAL
WBC # BLD AUTO: 8.3 X10(3) UL (ref 4–11)

## 2024-02-20 PROCEDURE — 96374 THER/PROPH/DIAG INJ IV PUSH: CPT

## 2024-02-20 PROCEDURE — 85025 COMPLETE CBC W/AUTO DIFF WBC: CPT | Performed by: STUDENT IN AN ORGANIZED HEALTH CARE EDUCATION/TRAINING PROGRAM

## 2024-02-20 PROCEDURE — 81003 URINALYSIS AUTO W/O SCOPE: CPT | Performed by: STUDENT IN AN ORGANIZED HEALTH CARE EDUCATION/TRAINING PROGRAM

## 2024-02-20 PROCEDURE — 93005 ELECTROCARDIOGRAM TRACING: CPT

## 2024-02-20 PROCEDURE — 99284 EMERGENCY DEPT VISIT MOD MDM: CPT

## 2024-02-20 PROCEDURE — 99285 EMERGENCY DEPT VISIT HI MDM: CPT

## 2024-02-20 PROCEDURE — 72100 X-RAY EXAM L-S SPINE 2/3 VWS: CPT | Performed by: STUDENT IN AN ORGANIZED HEALTH CARE EDUCATION/TRAINING PROGRAM

## 2024-02-20 PROCEDURE — 96372 THER/PROPH/DIAG INJ SC/IM: CPT

## 2024-02-20 PROCEDURE — 80048 BASIC METABOLIC PNL TOTAL CA: CPT | Performed by: STUDENT IN AN ORGANIZED HEALTH CARE EDUCATION/TRAINING PROGRAM

## 2024-02-20 PROCEDURE — 93010 ELECTROCARDIOGRAM REPORT: CPT

## 2024-02-20 RX ORDER — MORPHINE SULFATE 4 MG/ML
4 INJECTION, SOLUTION INTRAMUSCULAR; INTRAVENOUS ONCE
Status: DISCONTINUED | OUTPATIENT
Start: 2024-02-20 | End: 2024-02-20

## 2024-02-20 RX ORDER — KETOROLAC TROMETHAMINE 10 MG/1
10 TABLET, FILM COATED ORAL EVERY 6 HOURS PRN
Qty: 30 TABLET | Refills: 0 | Status: SHIPPED | OUTPATIENT
Start: 2024-02-20 | End: 2024-02-27

## 2024-02-20 RX ORDER — MORPHINE SULFATE 4 MG/ML
4 INJECTION, SOLUTION INTRAMUSCULAR; INTRAVENOUS ONCE
Status: COMPLETED | OUTPATIENT
Start: 2024-02-20 | End: 2024-02-20

## 2024-02-20 RX ORDER — ORPHENADRINE CITRATE 100 MG/1
100 TABLET, EXTENDED RELEASE ORAL 2 TIMES DAILY
Qty: 20 TABLET | Refills: 0 | Status: SHIPPED | OUTPATIENT
Start: 2024-02-20 | End: 2024-02-27

## 2024-02-20 RX ORDER — LIDOCAINE 50 MG/G
1 PATCH TOPICAL EVERY 24 HOURS
Qty: 7 PATCH | Refills: 0 | Status: SHIPPED | OUTPATIENT
Start: 2024-02-20 | End: 2024-02-27

## 2024-02-20 RX ORDER — ORPHENADRINE CITRATE 30 MG/ML
60 INJECTION INTRAMUSCULAR; INTRAVENOUS ONCE
Status: COMPLETED | OUTPATIENT
Start: 2024-02-20 | End: 2024-02-20

## 2024-02-20 RX ORDER — KETOROLAC TROMETHAMINE 30 MG/ML
30 INJECTION, SOLUTION INTRAMUSCULAR; INTRAVENOUS ONCE
Status: COMPLETED | OUTPATIENT
Start: 2024-02-20 | End: 2024-02-20

## 2024-02-20 NOTE — ED PROVIDER NOTES
Patient Seen in: [facility] Emergency Department      History     Chief Complaint   Patient presents with    Back Pain     Stated Complaint: Back Pain    Subjective:   HPI    48-year-old female history of PCOS, seizure disorder presenting for evaluation of back pain.  She is brought in by EMS from a massage parlor after she had a syncopal episode.  She woke up this morning with pain in her right lower back.  Worse with twisting and turning and standing up and walking.  No radiation to the abdomen.  No chest pain or difficulty breathing.  When she stood up after the massage she had increasing pain in her right side felt lightheaded and passed out for which EMS was called.  She does relate history of passing out in the past.  She has no known history of cardiac disease.    Objective:   Past Medical History:   Diagnosis Date    PCOS (polycystic ovarian syndrome)     Seizure disorder (HCC)               Past Surgical History:   Procedure Laterality Date    TOTAL ABDOM HYSTERECTOMY                  Social History     Socioeconomic History    Marital status:    Tobacco Use    Smoking status: Never    Smokeless tobacco: Never   Substance and Sexual Activity    Alcohol use: Never    Drug use: Never              Review of Systems    Positive for stated complaint: Back Pain  Other systems are as noted in HPI.  Constitutional and vital signs reviewed.      All other systems reviewed and negative except as noted above.    Physical Exam     ED Triage Vitals [02/20/24 1519]   /73   Pulse 98   Resp 18   Temp 97.6 °F (36.4 °C)   Temp src Temporal   SpO2 98 %   O2 Device None (Room air)       Current:/79   Pulse 98   Temp 97.6 °F (36.4 °C) (Temporal)   Resp 18   LMP  (LMP Unknown)   SpO2 98%         Physical Exam    Constitutional: awake, alert, no sig distress  HENT: mmm, no lesions,  Neck: normal range of motion, no tenderness, supple.  Eyes: PERRL, EOMI, conjunctiva normal, no discharge. Sclera  anicteric.  Cardiovascular: rr no murmur  Respiratory: Normal breath sounds, no respiratory distress, no wheezing, no chest tenderness.  GI: Bowel sounds normal, Soft, no tenderness, no masses, no pulsatile masses.  : No CVA tenderness.  Skin: Warm, dry, no erythema, no rash.  Musculoskeletal: Intact distal pulses, no edema, Right lumbar paraspinal tenderness to palpation - no midline pain/stepoff deformity, no cyanosis, no clubbing. Good range of motion in all major joints. No tenderness to palpation or major deformities noted. Back- No tenderness.  Neurologic: Alert & oriented x 3, normal motor function, normal sensory function, no focal deficits noted.  Psych: Calm, cooperative, nl affect        ED Course     Labs Reviewed   BASIC METABOLIC PANEL (8) - Abnormal; Notable for the following components:       Result Value    BUN/CREA Ratio 22.6 (*)     All other components within normal limits   CBC WITH DIFFERENTIAL WITH PLATELET    Narrative:     The following orders were created for panel order CBC With Differential With Platelet.  Procedure                               Abnormality         Status                     ---------                               -----------         ------                     CBC W/ DIFFERENTIAL[[id number]]                              Final result                 Please view results for these tests on the individual orders.   URINALYSIS WITH CULTURE REFLEX   CBC W/ DIFFERENTIAL     EKG    Rate, intervals and axe prodromal symptoms.  s as noted on EKG Report.  Rate: 84  Rhythm: Sinus Rhythm  Reading: NSR, normal axis/intervals, no st or t wave changes, no stemi   -No evidence of pre-excitation                          MDM      48F hx as above who presents with low back pain, syncopal episode. On arrival VSS, reassuring  -The episode of syncope itself seems likely vagal in etiology.  Given previous history of syncopal episodes, no significant cardiac history and   Ddx: MSK pain, lumbar  fracture, metabolic derangement, vasovagal syncope  -Given Toradol Norflex, morphine and lidocaine patch    Laboratory studies are without acute finding.  Modest improvement in pain after the above-stated interventions.  No evidence of bowel bladder dysfunction, low concern at this time for cauda equina cord compression or spinal epidural abscess.  Return precautions and follow-up instructions were discussed with patient who voiced understanding and agreement the plan.  All questions were answered to patient satisfaction.                             Medical Decision Making      Disposition and Plan     Clinical Impression:  1. Acute right-sided low back pain without sciatica    2. Syncope and collapse         Disposition:  Discharge  2/20/2024  7:17 pm    Follow-up:  [facility] Emergency Department  [address]  Health system [address]  [phone]  Follow up  As needed, If symptoms worsen    Mine Slater MD  [address]  [place] [id number] [phone]    Call            Medications Prescribed:  Discharge Medication List as of 2/20/2024  7:20 PM        START taking these medications    Details   Ketorolac Tromethamine 10 MG Oral Tab Take 1 tablet (10 mg total) by mouth every 6 (six) hours as needed for Pain., Normal, Disp-30 tablet, R-0      orphenadrine  MG Oral Tablet 12 Hr Take 100 mg by mouth 2 (two) times daily for 7 days., Normal, Disp-20 tablet, R-0      lidocaine 5 % External Patch Place 1 patch onto the skin daily for 7 days., Normal, Disp-7 patch, R-0

## 2024-02-20 NOTE — ED INITIAL ASSESSMENT (HPI)
Pt to ED via EMS for lower back pain after receiving a massage. Pt denies any radiation down legs, denies loss of bowel function, is having trouble walking. Denies any previous back issues.

## 2024-02-20 NOTE — ED QUICK NOTES
Pt ambulated to bathroom with unsteady gait, reports pain when bearing weight on right leg. This rn assisted pt to bathroom.

## 2024-02-21 LAB
ATRIAL RATE: 84 BPM
P AXIS: 52 DEGREES
P-R INTERVAL: 154 MS
Q-T INTERVAL: 366 MS
QRS DURATION: 96 MS
QTC CALCULATION (BEZET): 432 MS
R AXIS: 21 DEGREES
T AXIS: 57 DEGREES
VENTRICULAR RATE: 84 BPM

## 2024-02-21 NOTE — DISCHARGE INSTRUCTIONS
Please return to the emergency department if you develop severe pain that is not controlled by  pain medications or if you are unable to walk because of pain or weakness. Return to the  emergency department immediately if you develop fevers, loss of bowel or bladder control  (dribbling of urine or having accidents you wouldn't normally have), inability to urinate,  numbness of your genital or anal area, or weakness/numbness of your legs or arms as these  could all be signs of a serious medical emergency.

## (undated) NOTE — ED AVS SNAPSHOT
Thomas Salazar   MRN: [id number]    Department:  [facility] Emergency Department   Date of Visit:  3/9/2018           Disclosure     Insurance plans vary and the physician(s) referred by the ER may not be covered by your plan.  Please contact your CARE PHYSICIAN AT ONCE OR RETURN IMMEDIATELY TO THE EMERGENCY DEPARTMENT. If you have been prescribed any medication(s), please fill your prescription right away and begin taking the medication(s) as directed.   If you believe that any of the medications